# Patient Record
Sex: MALE | Race: WHITE | HISPANIC OR LATINO | URBAN - METROPOLITAN AREA
[De-identification: names, ages, dates, MRNs, and addresses within clinical notes are randomized per-mention and may not be internally consistent; named-entity substitution may affect disease eponyms.]

---

## 2017-07-24 ENCOUNTER — EMERGENCY (EMERGENCY)
Facility: HOSPITAL | Age: 32
LOS: 1 days | Discharge: HOME | End: 2017-07-24
Attending: EMERGENCY MEDICINE | Admitting: EMERGENCY MEDICINE
Payer: COMMERCIAL

## 2017-07-24 VITALS
DIASTOLIC BLOOD PRESSURE: 69 MMHG | TEMPERATURE: 97 F | HEART RATE: 51 BPM | SYSTOLIC BLOOD PRESSURE: 107 MMHG | RESPIRATION RATE: 16 BRPM | OXYGEN SATURATION: 96 %

## 2017-07-24 VITALS
DIASTOLIC BLOOD PRESSURE: 81 MMHG | SYSTOLIC BLOOD PRESSURE: 132 MMHG | OXYGEN SATURATION: 98 % | TEMPERATURE: 99 F | WEIGHT: 178.57 LBS | HEART RATE: 65 BPM | RESPIRATION RATE: 16 BRPM

## 2017-07-24 DIAGNOSIS — M54.5 LOW BACK PAIN: ICD-10-CM

## 2017-07-24 PROCEDURE — 72100 X-RAY EXAM L-S SPINE 2/3 VWS: CPT

## 2017-07-24 PROCEDURE — 99283 EMERGENCY DEPT VISIT LOW MDM: CPT | Mod: 25

## 2017-07-24 PROCEDURE — 99283 EMERGENCY DEPT VISIT LOW MDM: CPT

## 2017-07-24 PROCEDURE — 96372 THER/PROPH/DIAG INJ SC/IM: CPT

## 2017-07-24 PROCEDURE — 72100 X-RAY EXAM L-S SPINE 2/3 VWS: CPT | Mod: 26

## 2017-07-24 RX ORDER — CYCLOBENZAPRINE HYDROCHLORIDE 10 MG/1
1 TABLET, FILM COATED ORAL
Qty: 9 | Refills: 0 | OUTPATIENT
Start: 2017-07-24 | End: 2017-07-27

## 2017-07-24 RX ORDER — CYCLOBENZAPRINE HYDROCHLORIDE 10 MG/1
10 TABLET, FILM COATED ORAL ONCE
Qty: 0 | Refills: 0 | Status: COMPLETED | OUTPATIENT
Start: 2017-07-24 | End: 2017-07-24

## 2017-07-24 RX ORDER — LIDOCAINE 4 G/100G
1 CREAM TOPICAL ONCE
Qty: 0 | Refills: 0 | Status: COMPLETED | OUTPATIENT
Start: 2017-07-24 | End: 2017-07-24

## 2017-07-24 RX ORDER — KETOROLAC TROMETHAMINE 30 MG/ML
60 SYRINGE (ML) INJECTION ONCE
Qty: 0 | Refills: 0 | Status: DISCONTINUED | OUTPATIENT
Start: 2017-07-24 | End: 2017-07-24

## 2017-07-24 RX ORDER — IBUPROFEN 200 MG
1 TABLET ORAL
Qty: 20 | Refills: 0 | OUTPATIENT
Start: 2017-07-24 | End: 2017-07-29

## 2017-07-24 RX ADMIN — LIDOCAINE 1 PATCH: 4 CREAM TOPICAL at 14:18

## 2017-07-24 RX ADMIN — Medication 60 MILLIGRAM(S): at 14:17

## 2017-07-24 RX ADMIN — CYCLOBENZAPRINE HYDROCHLORIDE 10 MILLIGRAM(S): 10 TABLET, FILM COATED ORAL at 14:18

## 2017-07-24 NOTE — ED PROVIDER NOTE - ATTENDING CONTRIBUTION TO CARE
I have personally performed a face to face diagnostic evaluation on this patient.  I have reviewed the PA note and agree with the history, exam, and plan of care, except as noted.  History and Exam by me shows 32 male presents to ER c/o low back pain, states it started yesterday while bending over while changing his 15 pound 4 month old child, patient traveled one hour from [facility] yesterday, today pain on more severe, worse with movement, better with rest, able to ambulate with assistance, no focal weakness, no numbness, no loss of bowel or bladder function, f/u pain control and xrays.

## 2017-07-24 NOTE — ED PROVIDER NOTE - OBJECTIVE STATEMENT
back pain after bending down to change his baby last night at home. back pain after bending down to change his baby last night at home. no hx of back problems.  no change in bowel and bladder habits

## 2017-07-24 NOTE — ED PROVIDER NOTE - CHPI ED SYMPTOMS NEG
no bowel dysfunction/no difficulty bearing weight/no bladder dysfunction/no numbness/no motor function loss

## 2017-07-24 NOTE — ED PROVIDER NOTE - PROGRESS NOTE DETAILS
patient resting comfortably, advised follow up with ortho, xray negative, rx for flexeril and motrin sent to pharmacy

## 2017-07-24 NOTE — ED PROVIDER NOTE - NS CPE EDP MUSC LUMBAR LOC
tenderness/paraspinal lumbar spine pain with flexion, extension, nvi , from no visible abnormalities

## 2017-07-24 NOTE — ED ADULT NURSE NOTE - OBJECTIVE STATEMENT
pt states he was putting his baby down to change him and he felt a twinge in his lower right back near spine. pt having difficulty bearing weight to walk.

## 2018-06-08 NOTE — ED PROVIDER NOTE - WEIGHT BEARING
"Anesthesia Transfer of Care Note    Patient: Sanjeev Gipson    Procedure(s) Performed: Procedure(s) (LRB):  ARTHROSCOPY, KNEE (Left)  RELEASE (Left)  MANIPULATION, KNEE (Left)  INJECTION, STEROID; Amniox left knee (Left)    Patient location: PACU    Anesthesia Type: general    Transport from OR: Transported from OR on 100% O2 by closed face mask with adequate spontaneous ventilation    Post pain: adequate analgesia    Post assessment: no apparent anesthetic complications and tolerated procedure well    Post vital signs: stable    Level of consciousness: awake    Nausea/Vomiting: no nausea/vomiting    Complications: none    Transfer of care protocol was followed      Last vitals:   Visit Vitals  /77 (BP Location: Left arm, Patient Position: Lying)   Pulse 94   Temp 36.7 °C (98.1 °F) (Oral)   Resp 17   Ht 5' 9" (1.753 m)   Wt 88.9 kg (196 lb)   SpO2 97%   BMI 28.94 kg/m²     " able

## 2023-06-20 NOTE — ED PROVIDER NOTE - CROS ED NEURO ALL NEG
No care due was identified.  [facility] Embedded Care Due Messages. Reference number: [id number].   6/19/2023 8:00:45 PM CDT   negative...